# Patient Record
Sex: MALE | Race: WHITE | NOT HISPANIC OR LATINO | Employment: FULL TIME
[De-identification: names, ages, dates, MRNs, and addresses within clinical notes are randomized per-mention and may not be internally consistent; named-entity substitution may affect disease eponyms.]

---

## 2022-01-28 ENCOUNTER — TRANSCRIBE ORDERS (OUTPATIENT)
Dept: OTHER | Age: 62
End: 2022-01-28
Payer: COMMERCIAL

## 2022-01-28 DIAGNOSIS — R10.11 CHRONIC RUQ PAIN: Primary | ICD-10-CM

## 2022-01-28 DIAGNOSIS — G89.29 CHRONIC RUQ PAIN: Primary | ICD-10-CM

## 2022-01-28 DIAGNOSIS — D18.03 HEPATIC HEMANGIOMA: ICD-10-CM

## 2022-04-11 ENCOUNTER — OFFICE VISIT (OUTPATIENT)
Dept: GASTROENTEROLOGY | Facility: CLINIC | Age: 62
End: 2022-04-11
Attending: INTERNAL MEDICINE
Payer: COMMERCIAL

## 2022-04-11 VITALS
DIASTOLIC BLOOD PRESSURE: 76 MMHG | HEART RATE: 74 BPM | HEIGHT: 69 IN | SYSTOLIC BLOOD PRESSURE: 114 MMHG | WEIGHT: 202.8 LBS | BODY MASS INDEX: 30.04 KG/M2 | OXYGEN SATURATION: 98 %

## 2022-04-11 DIAGNOSIS — D18.03 CAVERNOUS HEMANGIOMA OF LIVER: Primary | ICD-10-CM

## 2022-04-11 PROCEDURE — 99204 OFFICE O/P NEW MOD 45 MIN: CPT | Performed by: INTERNAL MEDICINE

## 2022-04-11 RX ORDER — ASPIRIN 81 MG/1
81 TABLET, CHEWABLE ORAL
COMMUNITY

## 2022-04-11 RX ORDER — ATORVASTATIN CALCIUM 40 MG/1
1 TABLET, FILM COATED ORAL DAILY
COMMUNITY
Start: 2021-11-02

## 2022-04-11 RX ORDER — METFORMIN HCL 500 MG
1 TABLET, EXTENDED RELEASE 24 HR ORAL DAILY
COMMUNITY
Start: 2021-11-02

## 2022-04-11 RX ORDER — LISINOPRIL 20 MG/1
1 TABLET ORAL DAILY
COMMUNITY
Start: 2021-11-02

## 2022-04-11 RX ORDER — ESOMEPRAZOLE MAGNESIUM 40 MG/1
40 CAPSULE, DELAYED RELEASE ORAL
COMMUNITY
Start: 2022-01-17

## 2022-04-11 ASSESSMENT — PAIN SCALES - GENERAL: PAINLEVEL: MILD PAIN (2)

## 2022-04-11 NOTE — NURSING NOTE
"Chief Complaint   Patient presents with     New Patient     Chronic RUQ pain  Hepatic hemangioma           Vitals:    04/11/22 1459   BP: 114/76   BP Location: Left arm   Patient Position: Sitting   Cuff Size: Adult Large   Pulse: 74   SpO2: 98%   Weight: 92 kg (202 lb 12.8 oz)   Height: 1.753 m (5' 9\")       Body mass index is 29.95 kg/m .    Selene Way MA    "

## 2022-04-11 NOTE — PROGRESS NOTES
[facility] Hepatology    New Patient Visit    Referring provider:  Matt Romero    Chief complaint: RUQ pain and liver lesions.     HPI:  Mr. Mendoza is a 61-year-old with diabetes mellitus, hypertension and we are seeing him for hepatic hemangiomas on the right upper quadrent pain.  He was seen for the abdominal pain by gastroenterologist and he had an upper endoscopy which showed that he has hiatal  hernia and gastroesophageal reflux with Dixon's esophagus without dysplasia.    He did have also abdominal ultrasound and this showed four lesions of which the largest was at it's maximum diameter 5.7.  An MRI was then done and it confirmed that this lesion were in fact hemangiomas.    Patient describes the pain initially as sharp and lasting longer.  Now it is off and on.  He denies any nausea or vomiting. He is moving his bowels at least once a day with no blood in it.  His appetite is good and he continues to have especially at night regurgitation and other symptoms of gastroesophageal reflux.  He also had a hepatobiliary scan which came back normal. Patient denies fevers, sweats, chills or weight loss.    Medical hx Surgical hx   Past Medical History:   Diagnosis Date     Diabetes mellitus, type 2 (H)      Hemangioma of intra-abdominal structure      HTN (hypertension)       No past surgical history on file.       Medications  Current Outpatient Medications   Medication Sig Dispense Refill     aspirin (ASA) 81 MG chewable tablet Take 81 mg by mouth       atorvastatin (LIPITOR) 40 MG tablet Take 1 tablet by mouth in the morning.       esomeprazole (NEXIUM) 40 MG DR capsule Take 40 mg by mouth       lisinopril (ZESTRIL) 20 MG tablet Take 1 tablet by mouth in the morning.       metFORMIN (GLUCOPHAGE-XR) 500 MG 24 hr tablet Take 1 tablet by mouth in the morning.         Allergies  No Known Allergies    Family hx Social hx   Family History   Problem Relation Age of Onset     Ovarian Cancer Mother       Social  "History     Tobacco Use     Smoking status: Former Smoker     Quit date: 3/31/2014     Years since quittin.0     Smokeless tobacco: Never Used   Substance Use Topics     Alcohol use: Yes     Alcohol/week: 6.0 standard drinks     Types: 6 Cans of beer per week     Drug use: Never          Review of systems  Denies any infections or fatigue.  He has no headaches or seizures.  Denies any cough shortness of breath or chest pain.  He is known to have diabetes mellitus type 2 but no other endocrinological diseases.  He has no anemia or easy bruising denies any psychiatric diagnosis.  No significant degenerative joint disease he has no eye hearing or ischemic problems either.  Otherwise a comprehensive review of systems was noncontributory.    Examination  /76 (BP Location: Left arm, Patient Position: Sitting, Cuff Size: Adult Large)   Pulse 74   Ht 1.753 m (5' 9\")   Wt 92 kg (202 lb 12.8 oz)   SpO2 98%   BMI 29.95 kg/m    Body mass index is 29.95 kg/m .    Gen- well, NAD, A+Ox3, normal color  Eye- EOMI  ENT- MMM, normal oropharynx  Lym- no palpable lymphadenopathy  CVS- S1, S2 normal, no added sounds, RRR  RS- CTA  Abd- Obese.  Extr- pulses good, no LINDA  MS- hands normal- no clubbing  Neuro- A+Ox3, no asterixis  Skin- no rash or jaundice  Psych- normal mood    Laboratory  No results found for this visit.    Radiology    Assessment  61 year old male with right upper quadrant abdominal pain who had an evaluation by gastroenterologist.  His findings were hiatal hernia with Dixon's esophagus.  He had also an MRI and ultrasound prior to that and this showed that he had 4 hemangiomas as indicated above.  Not quite sure if his abdominal pain is related with above lesions.  Hemangiomas can give pain only in case they have complications otherwise they are incidental findings.    Plan  We will present his case at our multidisciplinary tumor conference and we will come up with a plan.  In case it is decided that no " intervention is needed then he will follow with his local gastroenterologist regarding his other findings of the upper GI tract.    For all his other medical issues will follow with his primary care physician.    I spent 45 minutes on this encounter 4/11/2022 in chart reviewing, history taking, physical examination and documentation.  I spent some of the time in coordination of care and counseling.    Danae Hodges MD  Hepatology  [facility]

## 2022-06-01 ENCOUNTER — TELEPHONE (OUTPATIENT)
Dept: GASTROENTEROLOGY | Facility: CLINIC | Age: 62
End: 2022-06-01

## 2022-06-01 NOTE — TELEPHONE ENCOUNTER
Khadar, RN Care Coordinator calling with pt's primary care clinic with [facility] on behalf of PCP, Dr. Sherice Andino.    Pt had a visit with Dr. Hodges on 4/11/22 for consult regarding cavernous hemangioma of liver. From Dr. Hodges's visit notes:  Plan  We will present his case at our multidisciplinary tumor conference and we will come up with a plan.  In case it is decided that no intervention is needed then he will follow with his local gastroenterologist regarding his other findings of the upper GI tract.      Pt's PCP is wondering if this tumor conference has happened yet, and if so, they would like an update on the plan regarding this patient.    Clinic callback number [phone]      Ria Crawford RN Care Coordinator  [facility]  Hepatology Clinic/Specialty Program     RETINAL THINNING INFERIOR TEMPORAL.

## 2022-07-27 DIAGNOSIS — D18.03 CAVERNOUS HEMANGIOMA OF LIVER: Primary | ICD-10-CM

## 2022-12-28 ENCOUNTER — TELEPHONE (OUTPATIENT)
Dept: GASTROENTEROLOGY | Facility: CLINIC | Age: 62
End: 2022-12-28

## 2022-12-28 NOTE — TELEPHONE ENCOUNTER
[facility] 12/28 to schedule next avail follow up with Dr. Hodges.      ----- Message from Stephanie Cohn LPN sent at 12/28/2022  1:30 PM CST -----  Regarding: FW: see that pt is scheduled  Please get pt scheduled with Dr. Hodges  ----- Message -----  From: Stephanie Cohn LPN  Sent: 12/13/2022  11:25 AM CST  To: Stephanie Cohn LPN  Subject: see that pt is scheduled

## 2022-12-30 ENCOUNTER — TELEPHONE (OUTPATIENT)
Dept: GASTROENTEROLOGY | Facility: CLINIC | Age: 62
End: 2022-12-30

## 2022-12-30 NOTE — TELEPHONE ENCOUNTER
Left Voicemail (2nd Attempt)and sent physical letter for the patient to call back and schedule the following:    Appointment type: follow up - liver  Provider: Dr. Hodges  Return date: as soon as possible  Specialty phone number: [phone]  Additional appointment(s) needed: na  Additonal Notes:     AN LVM to schedule follow up with Dr. Hodges - sending physical letter as well - 12.30.22, second attempt